# Patient Record
Sex: FEMALE | Race: WHITE | NOT HISPANIC OR LATINO | URBAN - METROPOLITAN AREA
[De-identification: names, ages, dates, MRNs, and addresses within clinical notes are randomized per-mention and may not be internally consistent; named-entity substitution may affect disease eponyms.]

---

## 2024-06-24 ENCOUNTER — TELEPHONE (OUTPATIENT)
Age: 64
End: 2024-06-24

## 2024-06-24 NOTE — TELEPHONE ENCOUNTER
Pt has an appt on 7/9/24.  She said that she was scheduled for a BDS 30min prior to her appt before we transitioned to [facility] and now isn't scheduled for a BDS.  Can the BDS be added back to her appt on 7/9/24? -Amanda RICHMOND

## 2024-06-24 NOTE — TELEPHONE ENCOUNTER
Pt notified via [employer] that we aren't currently doing Dexas.  I advised to pt to discuss with C at her 7/9/24 appt and if she still needed her to have a Dexa, she could give her an order to have at an outside facility. She was OK with that. -Amanda Mead, RMA

## 2024-10-10 PROBLEM — I73.00 RAYNAUD'S DISEASE: Status: ACTIVE | Noted: 2024-10-10

## 2024-10-28 ENCOUNTER — TELEPHONE (OUTPATIENT)
Age: 64
End: 2024-10-28
Payer: MEDICARE

## 2024-10-28 NOTE — TELEPHONE ENCOUNTER
PT HAD TO BE CANCELLED DUE TO A PROVIDER EMERGENCY. I HAVE REACHED OUT VIA ARTERA, [employer] AND PHONE. IF PT CALLS BACK TO RESCHEDULE, PLEASE SCHEDULE WITH WAYNE XIONG OR [facility]. IF THE PT WANTS TO SEE DR. TOMAS, SHE IS BOOKED UNTIL FEB. THEY MAY BE ADDED TO OUR WAIT LIST FOR ANY FUTURE CANCELLATIONS.      -HUB OKAY TO SCHEDULE.

## 2024-11-05 ENCOUNTER — TELEPHONE (OUTPATIENT)
Age: 64
End: 2024-11-05
Payer: MEDICARE

## 2024-11-05 DIAGNOSIS — Z79.1 LONG TERM (CURRENT) USE OF NON-STEROIDAL ANTI-INFLAMMATORIES (NSAID): Primary | ICD-10-CM

## 2024-11-05 RX ORDER — CELECOXIB 200 MG/1
200 CAPSULE ORAL DAILY
Qty: 30 CAPSULE | Refills: 2 | Status: SHIPPED | OUTPATIENT
Start: 2024-11-05

## 2024-11-05 RX ORDER — METHOCARBAMOL 750 MG/1
750 TABLET, FILM COATED ORAL 4 TIMES DAILY PRN
Qty: 120 TABLET | Refills: 2 | Status: SHIPPED | OUTPATIENT
Start: 2024-11-05

## 2024-11-05 NOTE — TELEPHONE ENCOUNTER
I sent in both Celebrex and Methocarbamol per JACOB Moise for a one month supply with 2 refills to [facility] in [place]. Pt notified that rx had been sent via CallApp. -BASILIO Jorge

## 2024-11-05 NOTE — TELEPHONE ENCOUNTER
Pt sent message via [employer] requesting refills for her Celebrex and Methocarbamol.  She is asking that they be sent to [facility]. She has appt with [facility] on 3/5/25, but states that our office has cancelled the last 2 appointments she's had. She hasn't had recent labs because she was going to have done at her appt. OK to refill meds?  Do you want her to have labs prior to seeing [facility] on 3/5/25?  If so, she's going to use [facility] in [place]. -Amanda Mead, A

## 2024-11-05 NOTE — TELEPHONE ENCOUNTER
OK.  I will let her know.  I have not sent in the refills yet.  I wanted to make sure it was OK with you. -Amanda Mead, RMA

## 2025-01-03 RX ORDER — HYDROXYCHLOROQUINE SULFATE 200 MG/1
200 TABLET, FILM COATED ORAL 2 TIMES DAILY
Qty: 180 TABLET | Refills: 2 | OUTPATIENT
Start: 2025-01-03

## 2025-01-03 NOTE — TELEPHONE ENCOUNTER
Patient needs updated labs for hydroxychloroquine refill. Per 11/5/24 telephone encounter she was mailed a lab order but we have not received results. Declining rx request.    HUB OK TO RELAY

## 2025-01-21 RX ORDER — HYDROXYCHLOROQUINE SULFATE 200 MG/1
200 TABLET, FILM COATED ORAL 2 TIMES DAILY
Qty: 180 TABLET | Refills: 1 | Status: SHIPPED | OUTPATIENT
Start: 2025-01-21

## 2025-01-21 NOTE — TELEPHONE ENCOUNTER
Pt is requesting a refill for her Plaquenil.  Labs are in chart and current.  They are dated for 1/15/25.  Also, pt had an eye exam in Sept and goes back for follow up in March 2025.  She has a return visit with Dr. Pulido on 3/5/25.  She is requesting that her Plaquenil be sent in for a 90-day supply this time to [facility].  Pt states that she is now taking it BID.  OK to send in qty 180 with no refills? -BASILIO Jorge

## 2025-02-12 ENCOUNTER — TELEPHONE (OUTPATIENT)
Age: 65
End: 2025-02-12
Payer: MEDICARE

## 2025-02-12 NOTE — TELEPHONE ENCOUNTER
PT APPT HAD TO BE CANCELLED. IF PT CALLS BACK TO RESCHEDULE, PLEASE SCHEDULE WITH WAYNE XIONG OR [facility]. PLEASE ADD PT TO THE CANCELLATION LIST AS NORMAL PRIOTIRY WITH AN END DATE OF 12/31/2025.   -HUB READY TO SCHEDULE.

## 2025-02-18 ENCOUNTER — TELEPHONE (OUTPATIENT)
Age: 65
End: 2025-02-18
Payer: MEDICARE

## 2025-02-18 RX ORDER — METHOCARBAMOL 750 MG/1
750 TABLET, FILM COATED ORAL 4 TIMES DAILY PRN
Qty: 120 TABLET | Refills: 2 | Status: SHIPPED | OUTPATIENT
Start: 2025-02-18

## 2025-02-18 RX ORDER — CELECOXIB 200 MG/1
200 CAPSULE ORAL DAILY
Qty: 30 CAPSULE | Refills: 2 | Status: SHIPPED | OUTPATIENT
Start: 2025-02-18

## 2025-02-18 NOTE — TELEPHONE ENCOUNTER
Caller: Olu Reevesesa L    Relationship: Self    Best call back number: [phone]     Requested Prescriptions:   Requested Prescriptions     Pending Prescriptions Disp Refills    celecoxib (CeleBREX) 200 MG capsule 30 capsule 2     Sig: Take 1 capsule by mouth Daily.        Pharmacy where request should be sent:      Last office visit with prescribing clinician: Visit date not found   Last telemedicine visit with prescribing clinician: Visit date not found   Next office visit with prescribing clinician:7.3.25    Additional details provided by patient: PT HAS ENOUGH UNTIL FRIDAY, WOULD LIKE 90 DAY SUPPLY    Does the patient have less than a 3 day supply:  [] Yes  [x] No    Would you like a call back once the refill request has been completed: [x] Yes [] No    If the office needs to give you a call back, can they leave a voicemail: [x] Yes [] No    Sonali Yang Rep   02/18/25 11:50 EST

## 2025-06-09 RX ORDER — CELECOXIB 200 MG/1
200 CAPSULE ORAL DAILY
Qty: 30 CAPSULE | Refills: 2 | Status: SHIPPED | OUTPATIENT
Start: 2025-06-09

## 2025-06-26 PROBLEM — Z79.899 ENCOUNTER FOR LONG-TERM (CURRENT) USE OF HIGH-RISK MEDICATION: Status: ACTIVE | Noted: 2025-06-26

## 2025-06-26 PROBLEM — R76.8 POSITIVE ANA (ANTINUCLEAR ANTIBODY): Status: ACTIVE | Noted: 2025-06-26

## 2025-06-26 PROBLEM — M25.50 ARTHRALGIA: Status: ACTIVE | Noted: 2025-06-26

## 2025-06-26 PROBLEM — M54.2 NECK PAIN: Status: ACTIVE | Noted: 2025-06-26

## 2025-06-26 PROBLEM — M54.50 LOW BACK PAIN: Status: ACTIVE | Noted: 2025-06-26

## 2025-06-26 PROBLEM — M85.859 OSTEOPENIA OF HIP: Status: ACTIVE | Noted: 2025-06-26

## 2025-06-26 NOTE — PROGRESS NOTES
Office Visit       Date: 07/03/2025   Patient Name: Kianna Reeves  MRN: [id number]  YOB: 1960    Referring Physician: No ref. provider found     Chief Complaint:   Chief Complaint   Patient presents with    Follow-up       History of Present Illness: Kianna Reeves is a 64 y.o. female who is here today for follow up of joint pain and Raynaud's.    She was last seen virtually by JN Dennis 1/9/24. Prior to that she was last in the office with Dr. Pulido 9/2023.    She continues on Plaquenil 200 mg BID which she does think has been helpful for her overall. We also prescribe her Celebrex and methocarbamol.  1 hour of am stiffness.  She has chronic back and neck pain. She follows with pain management Dr. Tsai and has injections. Her last injections were 6-8 months ago. She notes more hip pain, right more than left.   No rashes. No oral or nasal ulcers. No hair loss. No pleuritic pain. Occasional dry mouth and eyes but tolerable. Occasional elisabeth appearance of her hands and feet, but reports this is worse with exposure to heat, not cold.    Subjective   Review of systems:  Review of Systems   Endocrine: Positive for cold intolerance and heat intolerance.   Musculoskeletal:  Positive for arthralgias, back pain, neck pain and neck stiffness.   Skin:         Raynaud's   All other systems reviewed and are negative.      Past Medical History:   Past Medical History:   Diagnosis Date    Arthritis     Glaucoma     Osteoporosis     Stomach ulcer        Past Surgical History:   Past Surgical History:   Procedure Laterality Date    CYST REMOVAL         Family History:   Family History   Problem Relation Age of Onset    Gout Mother     Heart failure Mother     Arthritis Mother     Other (Low iron) Mother     Kidney disease Mother     Cancer Father        Social History:   Social History     Socioeconomic History    Marital status: Unknown   Tobacco Use    Smoking status: Every Day      Current packs/day: 1.00     Types: Cigarettes    Smokeless tobacco: Never   Vaping Use    Vaping status: Never Used   Substance and Sexual Activity    Alcohol use: Never    Drug use: Never    Sexual activity: Defer       Medications:   Current Outpatient Medications:     aspirin 81 MG chewable tablet, Chew 1 tablet Daily., Disp: , Rfl:     azelastine (ASTELIN) 0.1 % nasal spray, Administer 1 spray into the nostril(s) as directed by provider As Needed., Disp: , Rfl:     Calcium Carb-Cholecalciferol (calcium 500 mg vitamin D 5 mcg, 200 UT,) 500-5 MG-MCG tablet per tablet, Take 1 tablet by mouth Daily., Disp: , Rfl:     carvedilol (COREG) 3.125 MG tablet, Take 1 tablet by mouth 2 (Two) Times a Day With Meals., Disp: , Rfl:     celecoxib (CeleBREX) 200 MG capsule, Take 1 capsule by mouth Daily., Disp: 90 capsule, Rfl: 1    DULoxetine (CYMBALTA) 60 MG capsule, Take 1 capsule by mouth Daily., Disp: , Rfl:     fluticasone (FLONASE) 50 MCG/ACT nasal spray, Administer 1 spray into the nostril(s) as directed by provider Daily., Disp: , Rfl:     hydroxychloroquine (PLAQUENIL) 200 MG tablet, Take 1 tablet by mouth 2 (Two) Times a Day., Disp: 180 tablet, Rfl: 1    Ketotifen Fumarate (ZADITOR) 0.035 % solution, 2 (Two) Times a Day., Disp: , Rfl:     loratadine (CLARITIN) 10 MG tablet, Take 1 tablet by mouth Daily., Disp: , Rfl:     methocarbamol (ROBAXIN) 750 MG tablet, Take 1 tablet by mouth 4 (Four) Times a Day As Needed for Muscle Spasms., Disp: 360 tablet, Rfl: 1    multivitamin (Quintabs) tablet tablet, Take 1 tablet by mouth Daily., Disp: , Rfl:     omeprazole (priLOSEC) 20 MG capsule, Take 1 capsule by mouth Daily., Disp: , Rfl:     rosuvastatin (CRESTOR) 20 MG tablet, Take 1 tablet by mouth Daily., Disp: , Rfl:     verapamil ER (VERELAN) 120 MG 24 hr capsule, Take 1 capsule by mouth Every Night., Disp: , Rfl:     Allergies: No Known Allergies    I reviewed the patient's chief complaint, history of present illness, review  "of systems, past medical history, surgical history, family history, social history, medications and allergy list.     Objective    Vital Signs:   Vitals:    07/03/25 1312   BP: 130/78   Pulse: 90   Temp: 97.9 °F (36.6 °C)   Weight: 110 kg (243 lb 4.8 oz)   Height: 69 cm (27.17\")   PainSc: 8    PainLoc: Neck  Comment: lower back. knee, legs.     Body mass index is 231.81 kg/m².   Defer to PCP      Physical Exam:  Physical Exam  Constitutional:       Appearance: Normal appearance.   HENT:      Head: Normocephalic and atraumatic.   Eyes:      Conjunctiva/sclera: Conjunctivae normal.      Pupils: Pupils are equal, round, and reactive to light.   Cardiovascular:      Rate and Rhythm: Normal rate and regular rhythm.      Heart sounds: Normal heart sounds.   Pulmonary:      Effort: Pulmonary effort is normal.      Breath sounds: Normal breath sounds.   Musculoskeletal:         General: Normal range of motion.      Cervical back: Normal range of motion.   Skin:     General: Skin is warm and dry.   Neurological:      General: No focal deficit present.      Mental Status: She is alert and oriented to person, place, and time.   Psychiatric:         Mood and Affect: Mood normal.         Behavior: Behavior normal.         Thought Content: Thought content normal.         Judgment: Judgment normal.            Assessment / Plan      Assessment & Plan  Arthralgia, unspecified joint  7/2019 RF IGG 26.6 (25), RF IGA/IGM negative, CCP negative  Hips, knees and ankles  XRs of bilateral knees normal/negative although she does have crepitus on exam.  s/p Meloxicam- lost efficacy  Plaquenil started 6/22 with improvement  Morning stiffness 1 hour    Continue Plaquenil BID  Celebrex is helpful per patient.  Tylenol Arthritis 2 twice daily  She is on duloxetine with outside provider. Recommend she discuss increasing dose with PCP.  Most recent labs 1/14/25 stable  Check labs today, including labs checking for RA/lupus  RTC 6 months  Encounter " for long-term (current) use of high-risk medication  Plaquenil  Continue annual eye exams  CBC, CMP every 6 months    We have discussed the side effects of hydroxychloroquine including but not limited to GI upset, rash, photosensitivity, Hematologic and liver abnormalities and ocular abnormalities and need for frequent eye exam for toxicity monitoring  Low back pain, unspecified back pain laterality, unspecified chronicity, unspecified whether sciatica present  S/p Flexeril without relief.  12/2022 X-ray of the lumbosacral spine Right lateral spurs at T9 to T11. Spondylolisthesis of L4/5. Severe narrowing of L5/S1 and facet arthritis of L4/5 and L5/S1.  MRI 3/23 lumbar DDD Multiple HNP/ Disc osteophyte complexes contributing to mild canal and moderate foraminal narrowing.  Has seen pain management for injection therapy and RFA  Bilateral weakness and aching down into her legs.   10/2023 EMG NCS with Dr. Rios showed mild to moderate neuropathies from L5-S1    Continue Los flexion exercises at home- these are hard for her to do.  Continue to work on weight loss  Continue methocarbamol as needed for back pain.  Continue Celebrex  Continue follow-up with Dr. Tsai pain management in [place]  She could discuss tramadol, Lyrica, or gabapentin trial with her pain management specialist  She has right trochanteric bursa tenderness and does not increased bilateral hip pain. This could be coming from her back. She declined steroid injection for bursa. Topicals would be okay to try.  Neck pain  MRI of the cervical spine May 2017 -  Multilevel DDD, HNP C3-4, C5-6, C6-7. ? contusion of the cord at C5-6 versus demyelination    Celebrex is helpful.  Continue methocarbamol.  Heat/ice, CBD lotion or Biofreeze. Massage.   If the neck pain worsens or the pain radiates to the hands with weakness, would recommend a follow up MRI and NS consult  Raynaud's disease without gangrene  Cryoglobulins negative.  Scleroderma AB  negative.  Saw Dr. Swift in Feb 2020 - Minimal palpitation sx. Her Beta blockers were changed to Carvedilol  She denies three color change. Her sx may be atypical. Her feet often have elisabeth appearance that is aggravated by hot shower or when she is also hot.    Currently stable.   Continues on verapamil  Positive MARK ANTHONY (antinuclear antibody)  5/2019: MARK ANTHONY 1:320 (N); Secondary serologies negative.    No signs or sx of active CTD other than Raynauds- unchanged  SPF 50+  Osteopenia of hip, unspecified laterality  10/19 DEXA - T score of Spine is -1.3, T score for hip -2.4  She has been on Fosamax per PCP she thinks for about ~10 years    Recommend start drug holiday and stop alendronate as she reports she has been on this for about 10 years. Discussed we typically only use oral bisphosphonate drugs for 5-7 years as longer use increases risk for atypical femur fractures.   Calcium 800-1000mg per day  Vitamin D 1868-3763 IU of OTC D3 gel caps daily.  Weight bearing exercise.  Bone density was due in 2021. We discussed these should be done Q 2 years. We will order this with her next OV.   Encounter for follow-up examination after completed treatment for conditions other than malignant neoplasm  DEXA as above  Orders:    DEXA Bone Density Axial; Future      Discussed plan of care in detail with the patient today.  Patient verbalized understanding and agrees.    I confirm accuracy of unchanged data/findings which have been carried forward from previous visit.  I have updated appropriately those that have changed.    TIME SPENT: I spent 50 minutes caring for the patient on this date of service.  This time includes time spent by me in the following activities: Preparing for the visit, obtaining records, reviewing/ordering tests and independently reviewing results, performing a medically appropriate history/exam, counseling and educating the patient/family/caregiver, ordering medications, tests, or procedures, and documenting  information in the medical record.      Follow Up:   Return in about 6 months (around 1/3/2026) for JN Dennis, JN Cosme, with DEXA.        JN Cosme  [facility]

## 2025-06-26 NOTE — ASSESSMENT & PLAN NOTE
5/2019: MARK ANTHONY 1:320 (N); Secondary serologies negative.    No signs or sx of active CTD other than Raynauds- unchanged  SPF 50+

## 2025-06-26 NOTE — ASSESSMENT & PLAN NOTE
10/19 DEXA - T score of Spine is -1.3, T score for hip -2.4  She has been on Fosamax per PCP she thinks for about ~10 years    Recommend start drug holiday and stop alendronate as she reports she has been on this for about 10 years. Discussed we typically only use oral bisphosphonate drugs for 5-7 years as longer use increases risk for atypical femur fractures.   Calcium 800-1000mg per day  Vitamin D 2344-2995 IU of OTC D3 gel caps daily.  Weight bearing exercise.  Bone density was due in 2021. We discussed these should be done Q 2 years. We will order this with her next OV.

## 2025-06-26 NOTE — ASSESSMENT & PLAN NOTE
Plaquenil  Continue annual eye exams  CBC, CMP every 6 months    We have discussed the side effects of hydroxychloroquine including but not limited to GI upset, rash, photosensitivity, Hematologic and liver abnormalities and ocular abnormalities and need for frequent eye exam for toxicity monitoring

## 2025-06-26 NOTE — ASSESSMENT & PLAN NOTE
MRI of the cervical spine May 2017 -  Multilevel DDD, HNP C3-4, C5-6, C6-7. ? contusion of the cord at C5-6 versus demyelination    Celebrex is helpful.  Continue methocarbamol.  Heat/ice, CBD lotion or Biofreeze. Massage.   If the neck pain worsens or the pain radiates to the hands with weakness, would recommend a follow up MRI and NS consult

## 2025-06-26 NOTE — ASSESSMENT & PLAN NOTE
7/2019 RF IGG 26.6 (25), RF IGA/IGM negative, CCP negative  Hips, knees and ankles  XRs of bilateral knees normal/negative although she does have crepitus on exam.  s/p Meloxicam- lost efficacy  Plaquenil started 6/22 with improvement  Morning stiffness 1 hour    Continue Plaquenil BID  Celebrex is helpful per patient.  Tylenol Arthritis 2 twice daily  She is on duloxetine with outside provider. Recommend she discuss increasing dose with PCP.  Most recent labs 1/14/25 stable  Check labs today, including labs checking for RA/lupus  RTC 6 months

## 2025-06-26 NOTE — ASSESSMENT & PLAN NOTE
Cryoglobulins negative.  Scleroderma AB negative.  Saw Dr. Swift in Feb 2020 - Minimal palpitation sx. Her Beta blockers were changed to Carvedilol  She denies three color change. Her sx may be atypical. Her feet often have elisabeth appearance that is aggravated by hot shower or when she is also hot.    Currently stable.   Continues on verapamil

## 2025-07-03 ENCOUNTER — OFFICE VISIT (OUTPATIENT)
Age: 65
End: 2025-07-03
Payer: MEDICARE

## 2025-07-03 VITALS
HEART RATE: 90 BPM | DIASTOLIC BLOOD PRESSURE: 78 MMHG | HEIGHT: 55 IN | TEMPERATURE: 97.9 F | WEIGHT: 243.3 LBS | BODY MASS INDEX: 56.31 KG/M2 | SYSTOLIC BLOOD PRESSURE: 130 MMHG

## 2025-07-03 DIAGNOSIS — Z09 ENCOUNTER FOR FOLLOW-UP EXAMINATION AFTER COMPLETED TREATMENT FOR CONDITIONS OTHER THAN MALIGNANT NEOPLASM: ICD-10-CM

## 2025-07-03 DIAGNOSIS — M85.859 OSTEOPENIA OF HIP, UNSPECIFIED LATERALITY: ICD-10-CM

## 2025-07-03 DIAGNOSIS — Z79.899 ENCOUNTER FOR LONG-TERM (CURRENT) USE OF HIGH-RISK MEDICATION: ICD-10-CM

## 2025-07-03 DIAGNOSIS — R76.8 POSITIVE ANA (ANTINUCLEAR ANTIBODY): ICD-10-CM

## 2025-07-03 DIAGNOSIS — M54.2 NECK PAIN: ICD-10-CM

## 2025-07-03 DIAGNOSIS — M54.50 LOW BACK PAIN, UNSPECIFIED BACK PAIN LATERALITY, UNSPECIFIED CHRONICITY, UNSPECIFIED WHETHER SCIATICA PRESENT: ICD-10-CM

## 2025-07-03 DIAGNOSIS — I73.00 RAYNAUD'S DISEASE WITHOUT GANGRENE: ICD-10-CM

## 2025-07-03 DIAGNOSIS — M25.50 ARTHRALGIA, UNSPECIFIED JOINT: Primary | ICD-10-CM

## 2025-07-03 RX ORDER — HYDROXYCHLOROQUINE SULFATE 200 MG/1
200 TABLET, FILM COATED ORAL 2 TIMES DAILY
Qty: 180 TABLET | Refills: 1 | Status: SHIPPED | OUTPATIENT
Start: 2025-07-03

## 2025-07-03 RX ORDER — DIPHENOXYLATE HYDROCHLORIDE AND ATROPINE SULFATE 2.5; .025 MG/1; MG/1
1 TABLET ORAL DAILY
COMMUNITY

## 2025-07-03 RX ORDER — LORATADINE 10 MG/1
10 TABLET ORAL DAILY
COMMUNITY

## 2025-07-03 RX ORDER — METHOCARBAMOL 750 MG/1
750 TABLET, FILM COATED ORAL 4 TIMES DAILY PRN
Qty: 360 TABLET | Refills: 1 | Status: SHIPPED | OUTPATIENT
Start: 2025-07-03

## 2025-07-03 RX ORDER — FLUTICASONE PROPIONATE 50 MCG
1 SPRAY, SUSPENSION (ML) NASAL DAILY
COMMUNITY

## 2025-07-03 RX ORDER — ASPIRIN 81 MG/1
81 TABLET, CHEWABLE ORAL DAILY
COMMUNITY

## 2025-07-03 RX ORDER — CELECOXIB 200 MG/1
200 CAPSULE ORAL DAILY
Qty: 90 CAPSULE | Refills: 1 | Status: SHIPPED | OUTPATIENT
Start: 2025-07-03

## 2025-07-03 RX ORDER — AZELASTINE 1 MG/ML
1 SPRAY, METERED NASAL AS NEEDED
COMMUNITY
Start: 2025-04-08

## 2025-07-03 NOTE — PATIENT INSTRUCTIONS
Discuss increasing duloxetine with PCP  Consider tramadol, gabapentin, or Lyrica for low back pain with pain management